# Patient Record
Sex: FEMALE | Race: WHITE | URBAN - METROPOLITAN AREA
[De-identification: names, ages, dates, MRNs, and addresses within clinical notes are randomized per-mention and may not be internally consistent; named-entity substitution may affect disease eponyms.]

---

## 2017-01-05 LAB
ABO + RH BLD: NORMAL
ABO + RH BLD: NORMAL
BLD GP AB SCN SERPL QL: NORMAL
HBV SURFACE AG SERPL QL IA: NORMAL
RUBELLA ANTIBODY IGG QUANTITATIVE: NORMAL IU/ML
T PALLIDUM IGG SER QL: NORMAL

## 2017-07-18 LAB — GROUP B STREP PCR: NORMAL

## 2017-08-02 ENCOUNTER — ANESTHESIA (OUTPATIENT)
Dept: OBGYN | Facility: CLINIC | Age: 22
End: 2017-08-02
Payer: COMMERCIAL

## 2017-08-02 ENCOUNTER — ANESTHESIA EVENT (OUTPATIENT)
Dept: OBGYN | Facility: CLINIC | Age: 22
End: 2017-08-02
Payer: COMMERCIAL

## 2017-08-02 ENCOUNTER — HOSPITAL ENCOUNTER (INPATIENT)
Facility: CLINIC | Age: 22
LOS: 2 days | Discharge: HOME HEALTH | End: 2017-08-04
Attending: OBSTETRICS & GYNECOLOGY | Admitting: OBSTETRICS & GYNECOLOGY
Payer: COMMERCIAL

## 2017-08-02 PROBLEM — Z36.89 ENCOUNTER FOR TRIAGE IN PREGNANT PATIENT: Status: ACTIVE | Noted: 2017-08-02

## 2017-08-02 LAB
A1 MICROGLOB PLACENTAL VAG QL: POSITIVE
ABO + RH BLD: NORMAL
ABO + RH BLD: NORMAL
SPECIMEN EXP DATE BLD: NORMAL

## 2017-08-02 PROCEDURE — 99215 OFFICE O/P EST HI 40 MIN: CPT | Mod: 25 | Performed by: NURSE PRACTITIONER

## 2017-08-02 PROCEDURE — 3E0S3CZ INTRODUCTION OF REGIONAL ANESTHETIC INTO EPIDURAL SPACE, PERCUTANEOUS APPROACH: ICD-10-PCS | Performed by: OBSTETRICS & GYNECOLOGY

## 2017-08-02 PROCEDURE — 36415 COLL VENOUS BLD VENIPUNCTURE: CPT | Performed by: OBSTETRICS & GYNECOLOGY

## 2017-08-02 PROCEDURE — 25000128 H RX IP 250 OP 636: Performed by: ANESTHESIOLOGY

## 2017-08-02 PROCEDURE — 86780 TREPONEMA PALLIDUM: CPT | Performed by: OBSTETRICS & GYNECOLOGY

## 2017-08-02 PROCEDURE — 86901 BLOOD TYPING SEROLOGIC RH(D): CPT | Performed by: OBSTETRICS & GYNECOLOGY

## 2017-08-02 PROCEDURE — 12000029 ZZH R&B OB INTERMEDIATE

## 2017-08-02 PROCEDURE — 00HU33Z INSERTION OF INFUSION DEVICE INTO SPINAL CANAL, PERCUTANEOUS APPROACH: ICD-10-PCS | Performed by: OBSTETRICS & GYNECOLOGY

## 2017-08-02 PROCEDURE — 84112 EVAL AMNIOTIC FLUID PROTEIN: CPT | Performed by: OBSTETRICS & GYNECOLOGY

## 2017-08-02 PROCEDURE — 25000128 H RX IP 250 OP 636: Performed by: OBSTETRICS & GYNECOLOGY

## 2017-08-02 PROCEDURE — 86900 BLOOD TYPING SEROLOGIC ABO: CPT | Performed by: OBSTETRICS & GYNECOLOGY

## 2017-08-02 PROCEDURE — 59025 FETAL NON-STRESS TEST: CPT | Performed by: NURSE PRACTITIONER

## 2017-08-02 PROCEDURE — 37000011 ZZH ANESTHESIA WARD SERVICE

## 2017-08-02 RX ORDER — ROPIVACAINE HYDROCHLORIDE 2 MG/ML
10 INJECTION, SOLUTION EPIDURAL; INFILTRATION; PERINEURAL ONCE
Status: COMPLETED | OUTPATIENT
Start: 2017-08-02 | End: 2017-08-02

## 2017-08-02 RX ORDER — SODIUM CHLORIDE, SODIUM LACTATE, POTASSIUM CHLORIDE, CALCIUM CHLORIDE 600; 310; 30; 20 MG/100ML; MG/100ML; MG/100ML; MG/100ML
INJECTION, SOLUTION INTRAVENOUS CONTINUOUS
Status: DISCONTINUED | OUTPATIENT
Start: 2017-08-02 | End: 2017-08-03

## 2017-08-02 RX ORDER — IBUPROFEN 800 MG/1
800 TABLET, FILM COATED ORAL
Status: DISCONTINUED | OUTPATIENT
Start: 2017-08-02 | End: 2017-08-03

## 2017-08-02 RX ORDER — OXYTOCIN/0.9 % SODIUM CHLORIDE 30/500 ML
100-340 PLASTIC BAG, INJECTION (ML) INTRAVENOUS CONTINUOUS PRN
Status: COMPLETED | OUTPATIENT
Start: 2017-08-02 | End: 2017-08-03

## 2017-08-02 RX ORDER — METHYLERGONOVINE MALEATE 0.2 MG/ML
200 INJECTION INTRAVENOUS
Status: DISCONTINUED | OUTPATIENT
Start: 2017-08-02 | End: 2017-08-03

## 2017-08-02 RX ORDER — ONDANSETRON 2 MG/ML
4 INJECTION INTRAMUSCULAR; INTRAVENOUS EVERY 6 HOURS PRN
Status: DISCONTINUED | OUTPATIENT
Start: 2017-08-02 | End: 2017-08-03

## 2017-08-02 RX ORDER — ACETAMINOPHEN 325 MG/1
650 TABLET ORAL EVERY 4 HOURS PRN
Status: DISCONTINUED | OUTPATIENT
Start: 2017-08-02 | End: 2017-08-03

## 2017-08-02 RX ORDER — OXYCODONE AND ACETAMINOPHEN 5; 325 MG/1; MG/1
1 TABLET ORAL
Status: DISCONTINUED | OUTPATIENT
Start: 2017-08-02 | End: 2017-08-03

## 2017-08-02 RX ORDER — ONDANSETRON 4 MG/1
4 TABLET, ORALLY DISINTEGRATING ORAL EVERY 6 HOURS PRN
Status: DISCONTINUED | OUTPATIENT
Start: 2017-08-02 | End: 2017-08-03

## 2017-08-02 RX ORDER — OXYTOCIN 10 [USP'U]/ML
10 INJECTION, SOLUTION INTRAMUSCULAR; INTRAVENOUS
Status: DISCONTINUED | OUTPATIENT
Start: 2017-08-02 | End: 2017-08-03

## 2017-08-02 RX ORDER — FENTANYL CITRATE 50 UG/ML
100 INJECTION, SOLUTION INTRAMUSCULAR; INTRAVENOUS ONCE
Status: COMPLETED | OUTPATIENT
Start: 2017-08-02 | End: 2017-08-02

## 2017-08-02 RX ORDER — FENTANYL CITRATE 50 UG/ML
50-100 INJECTION, SOLUTION INTRAMUSCULAR; INTRAVENOUS
Status: DISCONTINUED | OUTPATIENT
Start: 2017-08-02 | End: 2017-08-03

## 2017-08-02 RX ORDER — NALOXONE HYDROCHLORIDE 0.4 MG/ML
.1-.4 INJECTION, SOLUTION INTRAMUSCULAR; INTRAVENOUS; SUBCUTANEOUS
Status: DISCONTINUED | OUTPATIENT
Start: 2017-08-02 | End: 2017-08-03

## 2017-08-02 RX ORDER — LIDOCAINE HYDROCHLORIDE AND EPINEPHRINE 15; 5 MG/ML; UG/ML
3 INJECTION, SOLUTION EPIDURAL
Status: DISCONTINUED | OUTPATIENT
Start: 2017-08-02 | End: 2017-08-03

## 2017-08-02 RX ORDER — PRENATAL VIT/IRON FUM/FOLIC AC 27MG-0.8MG
1 TABLET ORAL DAILY
COMMUNITY

## 2017-08-02 RX ORDER — EPHEDRINE SULFATE 50 MG/ML
5 INJECTION, SOLUTION INTRAMUSCULAR; INTRAVENOUS; SUBCUTANEOUS
Status: DISCONTINUED | OUTPATIENT
Start: 2017-08-02 | End: 2017-08-03

## 2017-08-02 RX ORDER — NALBUPHINE HYDROCHLORIDE 10 MG/ML
2.5-5 INJECTION, SOLUTION INTRAMUSCULAR; INTRAVENOUS; SUBCUTANEOUS EVERY 6 HOURS PRN
Status: DISCONTINUED | OUTPATIENT
Start: 2017-08-02 | End: 2017-08-03

## 2017-08-02 RX ORDER — CARBOPROST TROMETHAMINE 250 UG/ML
250 INJECTION, SOLUTION INTRAMUSCULAR
Status: DISCONTINUED | OUTPATIENT
Start: 2017-08-02 | End: 2017-08-03

## 2017-08-02 RX ADMIN — ROPIVACAINE HYDROCHLORIDE 10 ML: 2 INJECTION, SOLUTION EPIDURAL; INFILTRATION at 19:20

## 2017-08-02 RX ADMIN — Medication 12 ML/HR: at 19:19

## 2017-08-02 RX ADMIN — SODIUM CHLORIDE, POTASSIUM CHLORIDE, SODIUM LACTATE AND CALCIUM CHLORIDE: 600; 310; 30; 20 INJECTION, SOLUTION INTRAVENOUS at 19:23

## 2017-08-02 RX ADMIN — FENTANYL CITRATE 100 MCG: 50 INJECTION, SOLUTION INTRAMUSCULAR; INTRAVENOUS at 19:20

## 2017-08-02 RX ADMIN — SODIUM CHLORIDE, POTASSIUM CHLORIDE, SODIUM LACTATE AND CALCIUM CHLORIDE: 600; 310; 30; 20 INJECTION, SOLUTION INTRAVENOUS at 18:50

## 2017-08-02 RX ADMIN — SODIUM CHLORIDE, POTASSIUM CHLORIDE, SODIUM LACTATE AND CALCIUM CHLORIDE 1000 ML: 600; 310; 30; 20 INJECTION, SOLUTION INTRAVENOUS at 18:21

## 2017-08-02 NOTE — IP AVS SNAPSHOT
MRN:[id number]                      After Visit Summary   8/2/2017    Mikaela Chanel    MRN: [id number]           Thank you!     Thank you for choosing [facility] for your care. Our goal is always to provide you with excellent care. Hearing back from our patients is one way we can continue to improve our services. Please take a few minutes to complete the written survey that you may receive in the mail after you visit with us. Thank you!        Patient Information     Date Of Birth          1995        About your hospital stay     You were admitted on:  August 2, 2017 You last received care in the:  [address]    You were discharged on:  August 4, 2017       Who to Call     For medical emergencies, please call 911.  For non-urgent questions about your medical care, please call your primary care provider or clinic, None          Attending Provider     Provider Juan Antonio Melgar MD OB/Gyn       Primary Care Provider    None Specified      Further instructions from your care team       Postpartum Vaginal Delivery Instructions    Activity       Ask family and friends for help when you need it.    Do not place anything in your vagina for 6 weeks.    You are not restricted on other activities, but take it easy for a few weeks to allow your body to recover from delivery.  You are able to do any activities you feel up to that point.    No driving until you have stopped taking your pain medications (usually two weeks after delivery).     Call your health care provider if you have any of these symptoms:       Increased pain, swelling, redness, or fluid around your stiches from an episiotomy or perineal tear.    A fever above 100.4 F (38 C) with or without chills when placing a thermometer under your tongue.    You soak a sanitary pad with blood within 1 hour, or you see blood clots larger than a golf ball.    Bleeding that lasts more than 6  "weeks.    Vaginal discharge that smells bad.    Severe pain, cramping or tenderness in your lower belly area.    A need to urinate more frequently (use the toilet more often), more urgently (use the toilet very quickly), or it burns when you urinate.    Nausea and vomiting.    Redness, swelling or pain around a vein in your leg.    Problems breastfeeding or a red or painful area on your breast.    Chest pain and cough or are gasping for air.    Problems coping with sadness, anxiety, or depression.  If you have any concerns about hurting yourself or the baby, call your provider immediately.     You have questions or concerns after you return home.     Keep your hands clean:  Always wash your hands before touching your perineal area and stitches.  This helps reduce your risk of infection.  If your hands aren't dirty, you may use an alcohol hand-rub to clean your hands. Keep your nails clean and short.        Pending Results     No orders found from 2017 to 8/3/2017.            Admission Information     Date & Time Provider Department Dept. Phone    2017 Juan Antonio Hudson MD [address] [phone]      Your Vitals Were     Blood Pressure Temperature Respirations Weight Pulse Oximetry       117/61 98.5  F (36.9  C) (Oral) 16 102.1 kg (225 lb) 100%       MyChart Information     [employer] lets you send messages to your doctor, view your test results, renew your prescriptions, schedule appointments and more. To sign up, go to [email or web address] . Click on \"Log in\" on the left side of the screen, which will take you to the Welcome page. Then click on \"Sign up Now\" on the right side of the page.     You will be asked to enter the access code listed below, as well as some personal information. Please follow the directions to create your username and password.     Your access code is: [email or web address]  Expires: 2017  9:49 AM     Your access code will  in 90 days. If you " need help or a new code, please call your [facility] clinic or [phone].        Care EveryWhere ID     This is your Care EveryWhere ID. This could be used by other organizations to access your [facility] medical records  [id number]        Equal Access to Services     JYOTSNA GOVEA : Hadii aad ku hadaideedionte Sosy, wagabrielleda luqadaha, qaybta kaalmada adecassandra, tori rodríguezchuckyjuan pablo love. So [facility] [phone].    ATENCIÓN: Si habla español, tiene a hauser disposición servicios gratuitos de asistencia lingüística. Llame al [phone].    We comply with applicable federal civil rights laws and [place] laws. We do not discriminate on the basis of race, color, national origin, age, disability sex, sexual orientation or gender identity.               Review of your medicines      UNREVIEWED medicines. Ask your doctor about these medicines        Dose / Directions    ALBUTEROL IN        None Entered   Refills:  0       prenatal multivitamin  plus iron 27-0.8 MG Tabs per tablet        Dose:  1 tablet   Take 1 tablet by mouth daily   Refills:  0                Protect others around you: Learn how to safely use, store and throw away your medicines at www.disposemymeds.org.             Medication List: This is a list of all your medications and when to take them. Check marks below indicate your daily home schedule. Keep this list as a reference.      Medications           Morning Afternoon Evening Bedtime As Needed    ALBUTEROL IN   None Entered                                prenatal multivitamin  plus iron 27-0.8 MG Tabs per tablet   Take 1 tablet by mouth daily

## 2017-08-02 NOTE — PROVIDER NOTIFICATION
08/02/17 1739   Provider Notification   Provider Name/Title dr. pressley   Method of Notification Phone   Request Evaluate - Remote   Notification Reason Status Update   Dr. Pressley updated regarding fetal heart tones, uterine contractions, and rupture of membranes.  Plan to admit patient for labor and delivery.

## 2017-08-02 NOTE — PLAN OF CARE
presents to maternal assessment center for evaluation of rupture of membranes.  Patient reports clear fluid leaking starting at 1630 today, has noticed increase in contractions since last night.  Reports positive fetal movement.  External fetal and uterine monitors applied with patient consent.

## 2017-08-02 NOTE — IP AVS SNAPSHOT
[address]    [place] [address]    Phone:  [phone]                                       After Visit Summary   8/2/2017    Mikaela Chanel    MRN: [id number]           After Visit Summary Signature Page     I have received my discharge instructions, and my questions have been answered. I have discussed any challenges I see with this plan with the nurse or doctor.    ..........................................................................................................................................  Patient/Patient Representative Signature      ..........................................................................................................................................  Patient Representative Print Name and Relationship to Patient    ..................................................               ................................................  Date                                            Time    ..........................................................................................................................................  Reviewed by Signature/Title    ...................................................              ..............................................  Date                                                            Time

## 2017-08-03 LAB — T PALLIDUM IGG+IGM SER QL: NEGATIVE

## 2017-08-03 PROCEDURE — 12000037 ZZH R&B POSTPARTUM INTERMEDIATE

## 2017-08-03 PROCEDURE — 25000132 ZZH RX MED GY IP 250 OP 250 PS 637: Performed by: OBSTETRICS & GYNECOLOGY

## 2017-08-03 PROCEDURE — 72200001 ZZH LABOR CARE VAGINAL DELIVERY SINGLE

## 2017-08-03 PROCEDURE — 25000125 ZZHC RX 250: Performed by: OBSTETRICS & GYNECOLOGY

## 2017-08-03 RX ORDER — LANOLIN 100 %
OINTMENT (GRAM) TOPICAL
Status: DISCONTINUED | OUTPATIENT
Start: 2017-08-03 | End: 2017-08-04 | Stop reason: HOSPADM

## 2017-08-03 RX ORDER — OXYTOCIN/0.9 % SODIUM CHLORIDE 30/500 ML
340 PLASTIC BAG, INJECTION (ML) INTRAVENOUS CONTINUOUS PRN
Status: DISCONTINUED | OUTPATIENT
Start: 2017-08-03 | End: 2017-08-04 | Stop reason: HOSPADM

## 2017-08-03 RX ORDER — OXYTOCIN 10 [USP'U]/ML
10 INJECTION, SOLUTION INTRAMUSCULAR; INTRAVENOUS
Status: DISCONTINUED | OUTPATIENT
Start: 2017-08-03 | End: 2017-08-04 | Stop reason: HOSPADM

## 2017-08-03 RX ORDER — BISACODYL 10 MG
10 SUPPOSITORY, RECTAL RECTAL DAILY PRN
Status: DISCONTINUED | OUTPATIENT
Start: 2017-08-05 | End: 2017-08-04 | Stop reason: HOSPADM

## 2017-08-03 RX ORDER — HYDROCORTISONE 2.5 %
CREAM (GRAM) TOPICAL 3 TIMES DAILY PRN
Status: DISCONTINUED | OUTPATIENT
Start: 2017-08-03 | End: 2017-08-04 | Stop reason: HOSPADM

## 2017-08-03 RX ORDER — OXYTOCIN/0.9 % SODIUM CHLORIDE 30/500 ML
100 PLASTIC BAG, INJECTION (ML) INTRAVENOUS CONTINUOUS
Status: DISCONTINUED | OUTPATIENT
Start: 2017-08-03 | End: 2017-08-04 | Stop reason: HOSPADM

## 2017-08-03 RX ORDER — NALOXONE HYDROCHLORIDE 0.4 MG/ML
.1-.4 INJECTION, SOLUTION INTRAMUSCULAR; INTRAVENOUS; SUBCUTANEOUS
Status: DISCONTINUED | OUTPATIENT
Start: 2017-08-03 | End: 2017-08-04 | Stop reason: HOSPADM

## 2017-08-03 RX ORDER — ACETAMINOPHEN 325 MG/1
650 TABLET ORAL EVERY 4 HOURS PRN
Status: DISCONTINUED | OUTPATIENT
Start: 2017-08-03 | End: 2017-08-04 | Stop reason: HOSPADM

## 2017-08-03 RX ORDER — MISOPROSTOL 200 UG/1
400 TABLET ORAL
Status: DISCONTINUED | OUTPATIENT
Start: 2017-08-03 | End: 2017-08-04 | Stop reason: HOSPADM

## 2017-08-03 RX ORDER — IBUPROFEN 400 MG/1
400-800 TABLET, FILM COATED ORAL EVERY 6 HOURS PRN
Status: DISCONTINUED | OUTPATIENT
Start: 2017-08-03 | End: 2017-08-04 | Stop reason: HOSPADM

## 2017-08-03 RX ORDER — AMOXICILLIN 250 MG
1-2 CAPSULE ORAL 2 TIMES DAILY
Status: DISCONTINUED | OUTPATIENT
Start: 2017-08-03 | End: 2017-08-04 | Stop reason: HOSPADM

## 2017-08-03 RX ADMIN — ACETAMINOPHEN 650 MG: 325 TABLET, FILM COATED ORAL at 18:28

## 2017-08-03 RX ADMIN — ACETAMINOPHEN 650 MG: 325 TABLET, FILM COATED ORAL at 07:46

## 2017-08-03 RX ADMIN — SENNOSIDES AND DOCUSATE SODIUM 1 TABLET: 8.6; 5 TABLET ORAL at 07:46

## 2017-08-03 RX ADMIN — OXYTOCIN-SODIUM CHLORIDE 0.9% IV SOLN 30 UNIT/500ML 340 ML/HR: 30-0.9/5 SOLUTION at 01:47

## 2017-08-03 RX ADMIN — IBUPROFEN 800 MG: 400 TABLET ORAL at 13:41

## 2017-08-03 RX ADMIN — SENNOSIDES AND DOCUSATE SODIUM 1 TABLET: 8.6; 5 TABLET ORAL at 18:28

## 2017-08-03 RX ADMIN — IBUPROFEN 800 MG: 400 TABLET ORAL at 07:46

## 2017-08-03 RX ADMIN — IBUPROFEN 800 MG: 400 TABLET ORAL at 18:27

## 2017-08-03 RX ADMIN — ACETAMINOPHEN 650 MG: 325 TABLET, FILM COATED ORAL at 13:41

## 2017-08-03 RX ADMIN — ACETAMINOPHEN 650 MG: 325 TABLET, FILM COATED ORAL at 02:18

## 2017-08-03 RX ADMIN — IBUPROFEN 800 MG: 400 TABLET ORAL at 02:18

## 2017-08-03 NOTE — L&D DELIVERY NOTE
Delivery Summary    Mikaela Chanel MRN# [id number]   Age: 21 year old YOB: 1995     OB Delivery Summary  1.Pt is a 21 year old  @ 38w3d who presented to L&D with active labor and srom.  Prenatal Labs:    Blood type O+  Rubella imm    GBS neg  EFW: 8lbs    2.  Patient's prenatal course has been complicated by asthma  3.  Labor- Spontaneous  4   Analgesia- Epidural-  5.  Fetal heart tones-140 baseline, accelerations +, mod variability, variable decelerations just prior to delivery  6.  Labor interventions- none  7.  Membranes-SROM @ 1630 on 17  8.   Infant- viable, vigorous male delivered at 0143. on 8/3/2017, Apgars 8 and 9 at one and five minutes.  Weight pending.   9.   Placenta- delivered spontaneously at 0149, in tact with 3V cord   10.  Lacerations- none  11.  Complications- none  12.  QBL: 250cc      Juan Antonio Hudson  8/3/2017  2:00 AM             Labor Event Times    Labor onset date:  17 Onset time:  10:00 PM   Dilation complete date:  17 Complete time:  11:30 PM   Start pushing date/time:  8/3/2017 0115            Labor Length    1st Stage (hrs):  1 (min):  30   2nd Stage (hrs):  2 (min):  13   3rd Stage (hrs):  0 (min):  6      Labor Events     labor?:  No    steroids:  None   Labor Type:  Spontaneous   Predominate monitoring during 1st stage:  continuous electronic fetal monitoring      Antibiotics received during labor?:  No      Rupture date/time:     Rupture type:  Spontaneous rupture of membranes occuring during spontaneous labor or augmentation      1:1 continuous labor support provided by?:  RN          Delivery/Placenta Date and Time    Delivery Date:  8/3/17 Delivery Time:   1:43 AM   Placenta Date/Time:  8/3/2017  1:49 AM   Oxytocin given at the time of delivery:  after delivery of baby      Vaginal Counts    Initial count performed by 2 team members:   Two Team Members   LYUBOV Crooks Dr. Suture [place]  Sponges Instruments   Initial counts 2 0 5    Added to count       Final counts 2 0 5       Placed during labor Accounted for at the end of labor   NA NA   NA NA   NA NA      Final count performed by 2 team members:   Two Team Members   LYUBOV Crooks Dr.         Final count correct?:  Yes         Apgars    Living status:  Living    1 Minute 5 Minute 10 Minute 15 Minute 20 Minute   Skin color: 0  1       Heart rate: 2  2       Reflex irritability: 2  2       Muscle tone: 2  2       Respiratory effort: 2  2       Total: 8  9          Apgars assigned by:  TRIP DELEON RN      Cord    Vessels:  3 Vessels Complications:  None   Cord Blood Disposition:  Lab Gases Sent?:  No         [place] Resuscitation       Output in Delivery Room:  Stool      Skin to Skin and Feeding Plan    Skin to skin initiation date/time: 8/3/17 0145   Skin to skin with:  Mother   Skin to skin end date/time:     How do you plan to feed your baby:  Breastfeeding      Labor Events and Shoulder Dystocia    Fetal Tracing Prior to Delivery:  Category 1   Shoulder dystocia present?:  Neg            Delivery (Maternal) (Provider to Complete) (750117)    Episiotomy:  None   Perineal lacerations:  None          Mother's Information  Mother: Mikaela Chanel R #[id number]    Start of Mother's Information     IO Blood Loss  17 2200 - 17 0204    Mom's I/O Activity            End of Mother's Information  Mother: Mikaela Chanel R #[id number]            Delivery - Provider to Complete ([id number])    Delivering clinician:  DARLENE MEIER   Attempted Delivery Types (Choose all that apply):  Spontaneous Vaginal Delivery   Delivery Type (Choose the 1 that will go to the Birth History):  Vaginal, Spontaneous Delivery                           Placenta    Delayed Cord Clamping:  Done   Date/Time:  8/3/2017  1:49 AM   Removal:  Spontaneous   Disposition:  Hospital disposal      Anesthesia    Method:  Epidural   Cervical dilation at placement:   4-7         Presentation and Position    Presentation:  Vertex   Position:  Left Occiput Anterior                    Juan Antonio Hudson MD

## 2017-08-03 NOTE — PLAN OF CARE
Problem: Goal Outcome Summary  Goal: Goal Outcome Summary  Outcome: No Change  VSS, working on breastfeeding this shift, would like to use personal breastfeeding pillow for feeds, pain controlled with Tylenol and Ibuprofen, states satisfaction with pain management, up independently with no complaints of dizziness, significant other at bedside, interacting and bonding well with infant, will continue to monitor.

## 2017-08-03 NOTE — PLAN OF CARE
Assumed patient care. Patient sitting at bedside for epidural placement. Patient tolerated procedure well and placed in left tilt position.  2330 SVE per Dr. Hudson 10/100/0. Plan to labor down until 013  0103 FHT's not tracing- RN in room to adjust external US, FHT's audible at bedside in the 90's. O2 applied.  Patient moved from right lateral to semi fowlers, infant at +2 station. Set up to begin pushing.   0115 Began pushing  0130 Dr. Hudson at bedside for delivery  1794-4609 FHT's not tracing well during pushing- FHT's ranging from 190's to 80's audible at bedside. O2 on, Dr. Hudson present and aware of FHT's.   0143 Delivery of male infant, apgar 8/9. Placed skin to skin and bonding well with mother.

## 2017-08-03 NOTE — ANESTHESIA PROCEDURE NOTES
Peripheral nerve/Neuraxial procedure note : epidural catheter  Pre-Procedure  Performed by ELLIE PASTOR  Location: OB      Pre-Anesthestic Checklist: patient identified, IV checked, risks and benefits discussed, informed consent and pre-op evaluation    Timeout  Correct Patient: Yes   Correct Procedure: Yes   Correct Site: Yes   Correct Laterality: N/A   Correct Position: Yes   Site Marked: N/A   .   Procedure Documentation    .    Procedure:    Epidural catheter.  Insertion Site:L3-4  (midline approach) Injection technique: LORT air   Local skin infiltrated with 3 mL of 1% lidocaine.  CLAUDE at 5 cm     Patient Prep;mask, sterile gloves, povidone-iodine 7.5% surgical scrub, patient draped.  .  Needle: Touhy needle Needle Gauge: 17.    Needle Length (Inches) 3.5  # of attempts: 1 and # of redirects: : none. .   Catheter: 19 G . .  Catheter threaded easily  3.5 cm epidural space.  .   .    Assessment/Narrative  Paresthesias: No.  .  .  Aspiration negative for heme or CSF  . Test dose of 3 mL lidocaine 1.5% w/ 1:200,000 epinephrine at. Test dose negative for signs of intravascular, subdural or intrathecal injection. Comments:  Patient tolerated procedure well   No complications  Epidural bolused with 10 cc 0.2% ropivacaine with 100 mcg fentanyl

## 2017-08-03 NOTE — ANESTHESIA PREPROCEDURE EVALUATION
Anesthesia Evaluation     .             ROS/MED HX    ENT/Pulmonary:     (+)asthma , . .    Neurologic:       Cardiovascular:         METS/Exercise Tolerance:     Hematologic:         Musculoskeletal:         GI/Hepatic:         Renal/Genitourinary:         Endo:     (+) Obesity, .      Psychiatric:         Infectious Disease:         Malignancy:         Other:                                    Anesthesia Plan      History & Physical Review  History and physical reviewed and following examination; no interval change.    ASA Status:  2 .        Plan for Epidural   PONV prophylaxis:  Ondansetron (or other 5HT-3)       Postoperative Care  Postoperative pain management:  IV analgesics.      Consents  Anesthetic plan, risks, benefits and alternatives discussed with:  Patient..                          .

## 2017-08-03 NOTE — PLAN OF CARE
Problem: Goal Outcome Summary  Goal: Goal Outcome Summary  Outcome: Improving  Admitted into room 231. IV placed/ Pt requested epidural. Dr. Gerber in for epidural placement. ropivicane and fentanyl handed off. Consent signed. Time out done and epidural placed.  Report given to Adrianna MCARTHUR will resume cares.

## 2017-08-03 NOTE — PLAN OF CARE
Data: Mikaela Chanel transferred to [facility] via wheelchair at 0355. Baby transferred via parent's arms.  Action: Receiving unit notified of transfer: Yes. Patient and family notified of room change. Report given to Freddie MCMANUS RN at 0400. Belongings sent to receiving unit. Accompanied by Registered Nurse. Oriented patient to surroundings. Call light within reach. ID bands double-checked with receiving RN.  Response: Patient tolerated transfer and is stable.

## 2017-08-03 NOTE — PLAN OF CARE
Problem: Goal Outcome Summary  Goal: Goal Outcome Summary  Outcome: No Change  Vital signs stable. Up ad maurice. Voiding without difficulties. Bleeding wnl. Saline lock removed. Breast feeding improving. Taking tylenol and ibuprofen for pain control. Encouraged to call with any questions or concerns. Will continue to monitor.

## 2017-08-03 NOTE — LACTATION NOTE
Initial visit.   Breastfeeding handout given.   Advised to breastfeed exclusively, on demand, avoid pacifiers, bottles and formula unless medically indicated.  Encouraged rooming in, skin to skin, feeding on demand 8-12x/day or sooner if baby cues.  Explained benefits of holding and skin to skin.  Encouraged lots of skin to skin. Instructed on hand expression. Baby at right breast and taking a few licks.  No further questions at this time.   Continues to nurse well per mom.   Will follow as needed.   Heidi Russell RNC, IBCLC

## 2017-08-03 NOTE — H&P
OB Brief Admit H&P    No significant change in general health status based on examination of the patient, review of Nursing Admission Database and prenatal record.    Pt is a 21 year old  @ 38w3d who presented to L&D with active labor and srom.    Patient's prenatal course has been complicated by asthma    Prenatal Labs:    Blood type O+  Rubella imm    GBS neg    EFW: 8lbs    /58  Temp 98.4  F (36.9  C) (Temporal)  Resp 16  Wt 102.1 kg (225 lb)  SpO2 97%  EFM:  130mod V + accels no decels  [place]: q2min  SVE: 6/90%/-1  Membranes:  SROM clear 1630 2017     ASSESSMENT/PLAN:  Mikaela Chanel  is a 21 year old   At 38w3d by LMP c/w first tri US who presents for active labor and SROM.  1.  Admit to L&D  2. Labor spontaneous  3.  Fetal status is Category 1 with accelerations  4.  Pain management: epidural  5.  GBS neg  6. Anticipate     Juan Antonio Hudson DO  Dept of OB/GYN  2017

## 2017-08-03 NOTE — PROGRESS NOTES
OB Post-partum Note  PPD# 0    S:  Patient doing well.  Pain controlled.  Voiding.  Bleeding is normal.  Breast feeding. Delivered just after midnight.    O:  /59  Temp 98.1  F (36.7  C) (Oral)  Resp 16  Wt 102.1 kg (225 lb)  SpO2 100%  Breastfeeding? Unknown  Gen- A&O, NAD  Abd- Non-tender, fundus firm at umbilicus  Ext- non-tender, trace edema, no leg or calf pain    Hemoglobin   Date Value Ref Range Status   2009 13.5 11.7 - 15.7 g/dL Final     O+0  Rubella Immune    A/P: 21 year old  PPD#  s/p     1.  Routine post-partum cares  2.  Analgesia  3.  Discharge PPD#1 or 2  4.  The plan of care was discussed with the patient.  She expressed understanding and agreement      Marisol Kapadia  8/3/2017  9:08 AM

## 2017-08-03 NOTE — PLAN OF CARE
Patient arrived to floor at 0400 via wheelchair. Received report from Adrianna WALTERS RN. Patient and significant other oriented to room and floor. Infant safety discussed. Call light within reach. Pulse oximeter applied. Questions and concerns addressed. Will continue to monitor.

## 2017-08-04 VITALS
DIASTOLIC BLOOD PRESSURE: 61 MMHG | OXYGEN SATURATION: 100 % | RESPIRATION RATE: 16 BRPM | WEIGHT: 225 LBS | TEMPERATURE: 98.5 F | SYSTOLIC BLOOD PRESSURE: 117 MMHG

## 2017-08-04 LAB — HGB BLD-MCNC: 10.5 G/DL (ref 11.7–15.7)

## 2017-08-04 PROCEDURE — 25000132 ZZH RX MED GY IP 250 OP 250 PS 637: Performed by: OBSTETRICS & GYNECOLOGY

## 2017-08-04 PROCEDURE — 36415 COLL VENOUS BLD VENIPUNCTURE: CPT | Performed by: OBSTETRICS & GYNECOLOGY

## 2017-08-04 PROCEDURE — 85018 HEMOGLOBIN: CPT | Performed by: OBSTETRICS & GYNECOLOGY

## 2017-08-04 RX ADMIN — IBUPROFEN 800 MG: 400 TABLET ORAL at 08:11

## 2017-08-04 RX ADMIN — ACETAMINOPHEN 650 MG: 325 TABLET, FILM COATED ORAL at 00:24

## 2017-08-04 RX ADMIN — SENNOSIDES AND DOCUSATE SODIUM 1 TABLET: 8.6; 5 TABLET ORAL at 08:11

## 2017-08-04 RX ADMIN — ACETAMINOPHEN 650 MG: 325 TABLET, FILM COATED ORAL at 08:11

## 2017-08-04 RX ADMIN — IBUPROFEN 800 MG: 400 TABLET ORAL at 00:24

## 2017-08-04 NOTE — PLAN OF CARE
Problem: Goal Outcome Summary  Goal: Goal Outcome Summary  Outcome: Adequate for Discharge Date Met:  08/04/17  D: VSS, assessments WDL.   I: Pt. received complete discharge paperwork and home medications as filled by discharge pharmacy-none. Breast pump given.  Pt. was given times of last dose for all discharge medications in writing on discharge medication sheets.  Discharge teaching included home medication, pain management, activity restrictions, postpartum cares, and signs and symptoms of infection.    A: Discharge outcomes on care plan met.  Mother states understanding and comfort with self and baby cares.  P: Pt. discharged to home.  Pt. was discharged with baby, and bands were checked at time of discharge.  Pt. was accompanied by , nurse and baby, and left with personal belongings.  Home care sent.  Pt. to follow up with OB per MD order in 6 weeks for PP visit or sooner if issues arise.  Pt. had no further questions at the time of discharge and no unmet needs were identified.

## 2017-08-04 NOTE — PROGRESS NOTES
OB Post-partum Note  PPD# 1    S:  Patient doing well.  Pain controlled.  Voiding.  Bleeding normal.  Breast feeding.    O:  /61  Temp 98.5  F (36.9  C) (Oral)  Resp 16  Wt 102.1 kg (225 lb)  SpO2 100%  Breastfeeding? Unknown  Gen- A&O, NAD  Abd- Non-tender, fundus firm at umbilicus  Ext- non-tender, 1+ edema    Hemoglobin   Date Value Ref Range Status   2017 10.5 (L) 11.7 - 15.7 g/dL Final     O+  Rubella Immune    A/P: 21 year old  PPD# 1 s/p     1.  Routine post-partum cares.  2.  Anticipate d/c home later today per patient request.    Mahnaz Wilkins MD  2017  8:56 AM

## 2017-08-04 NOTE — ANESTHESIA POSTPROCEDURE EVALUATION
Patient: Mikaela Chanel    * No procedures listed *    Diagnosis:* No pre-op diagnosis entered *  Diagnosis Additional Information: No value filed.    Anesthesia Type:  No value filed.    Note:  Anesthesia Post Evaluation         Comments: Patient not seen.  Chart review with no obvious complicaitons related to labor epidural.         Last vitals:  Vitals:    08/03/17 0600 08/03/17 0746 08/03/17 1619   BP:  113/59 104/53   Resp: 16 16 16   Temp:  36.7  C (98.1  F) 36.8  C (98.3  F)   SpO2: 100%           Electronically Signed By: Miguel Zabala MD  August 3, 2017  10:15 PM

## 2017-08-04 NOTE — PLAN OF CARE
Problem: Goal Outcome Summary  Goal: Goal Outcome Summary  Outcome: Improving  VSS, working on breastfeeding q 2-3 hrs, pain controlled with Tylenol and Ibuprofen, states satisfaction with pain management, up independently with no complaints of dizziness,  at bedside, interacting and bonding well with infant, will continue to monitor.

## 2017-08-04 NOTE — PLAN OF CARE
Problem: Goal Outcome Summary  Goal: Goal Outcome Summary  Outcome: Improving  VSS. Fundus firm and midline. Pain 5/10, decreased with tylenol and ibuprofen. Breastfeeding every 2-3 hours. Encouraged to call with needs, questions, or concerns. Will continue to monitor.

## 2017-08-16 ENCOUNTER — DOCUMENTATION ONLY (OUTPATIENT)
Dept: CARE COORDINATION | Facility: CLINIC | Age: 22
End: 2017-08-16

## 2017-08-16 NOTE — PROGRESS NOTES
[facility] now requests orders and shares plan of care/discharge summaries for some patients through [facility].  Thank you for your assistance in improving collaboration for our patients.    [facility] has made 2 attempts to contact patient by phone over the last 4 days. Main phone number is disconnected and emergency contact is an invalid number. Unable to contact patient due to discharged from hospital prior to receiving referral and phone numbers not working.      Thank you for the referral    Sincerely,  [facility]  [phone]